# Patient Record
Sex: MALE | Race: WHITE | NOT HISPANIC OR LATINO | URBAN - NONMETROPOLITAN AREA
[De-identification: names, ages, dates, MRNs, and addresses within clinical notes are randomized per-mention and may not be internally consistent; named-entity substitution may affect disease eponyms.]

---

## 2020-06-19 ENCOUNTER — IMPORTED ENCOUNTER (OUTPATIENT)
Dept: URBAN - NONMETROPOLITAN AREA CLINIC 1 | Facility: CLINIC | Age: 70
End: 2020-06-19

## 2020-06-19 PROBLEM — H25.813: Noted: 2020-06-19

## 2020-06-19 PROBLEM — H02.002: Noted: 2020-06-19

## 2020-06-19 PROCEDURE — 99203 OFFICE O/P NEW LOW 30 MIN: CPT

## 2020-06-19 NOTE — PATIENT DISCUSSION
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 .-This can result in excessive tearing irritation infection scarring and loss of vision.-Start Tobdradex QID OD Sent to [facility] . May taper to BID once feeling better. -Start J and J shampoo on lids Cataract OU-Not yet surgical. -Reviewed symptoms of advancing cataract growth such as glare and halos and decreased vision.-Continue to monitor for now. Pt will notify us if any new symptoms develop.

## 2020-06-26 ENCOUNTER — IMPORTED ENCOUNTER (OUTPATIENT)
Dept: URBAN - NONMETROPOLITAN AREA CLINIC 1 | Facility: CLINIC | Age: 70
End: 2020-06-26

## 2020-06-26 PROBLEM — H25.813: Noted: 2020-06-19

## 2020-06-26 PROBLEM — H52.4: Noted: 2020-06-26

## 2020-06-26 PROBLEM — H02.002: Noted: 2020-06-19

## 2020-06-26 PROCEDURE — 92015 DETERMINE REFRACTIVE STATE: CPT

## 2020-06-26 PROCEDURE — 92012 INTRM OPH EXAM EST PATIENT: CPT

## 2020-06-26 NOTE — PATIENT DISCUSSION
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 Hernan Rangel - Continue to monitor

## 2020-07-09 ENCOUNTER — IMPORTED ENCOUNTER (OUTPATIENT)
Dept: URBAN - NONMETROPOLITAN AREA CLINIC 1 | Facility: CLINIC | Age: 70
End: 2020-07-09

## 2020-07-09 PROBLEM — H10.411: Noted: 2020-07-09

## 2020-07-09 PROBLEM — H25.813: Noted: 2020-07-09

## 2020-07-09 PROBLEM — H52.4: Noted: 2020-07-09

## 2020-07-09 PROBLEM — H01.021: Noted: 2020-07-09

## 2020-07-09 PROCEDURE — 99213 OFFICE O/P EST LOW 20 MIN: CPT

## 2020-07-09 NOTE — PATIENT DISCUSSION
Conjunctivitis OD / Blepharitis- Discussed diagnosis in detail with patient - Continue J & J baby shampoo to scrub lids- Start Tobradex QD or BID only as needed OD RX sent to pharmacy - Will consider consult if this continues - Continue to monitor ----------------------------------previous notes-------------------------------[place] OU- Discussed diagnosis in detail with patient- Discussed signs and symptoms of progression- Discussed UV protection- No treatment needed ra rhis time - Continue to monitorAstigmatism / Hyperopia / Presbyopia OU - Discussed diagnosis in detail with patient- New glasses RX given today- Continue to monitor- 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 Michael De La Cruz - Continue to monitor

## 2020-08-06 NOTE — PATIENT DISCUSSION
Do not recommend MF secondary to retinal pathology. Recommend patient maximize distance vision and use readers for near vision.

## 2020-08-19 NOTE — PATIENT DISCUSSION
Consider OD to follow. EYE OD, IOL TYPE Monofocal lens, POST OPERATIVE TARGET PL/-0.50, PACKAGE Custom.

## 2020-08-19 NOTE — PATIENT DISCUSSION
Proceed with OS first. EYE OS, IOL TYPE Monofocal lens, POST OPERATIVE TARGET PL/-0.50, PACKAGE Custom.

## 2020-08-19 NOTE — PATIENT DISCUSSION
Discussed increased risks of macular edema after cataract surgery secondary to ERM. Recommend extended drops schedule.

## 2020-08-19 NOTE — PATIENT DISCUSSION
Consider retinal evaluation after cataract surgery if ERM becomes bothersome. Patient has seen record [facility] in the 3-4 years.

## 2020-09-11 ENCOUNTER — IMPORTED ENCOUNTER (OUTPATIENT)
Dept: URBAN - NONMETROPOLITAN AREA CLINIC 1 | Facility: CLINIC | Age: 70
End: 2020-09-11

## 2020-09-11 PROBLEM — H25.813: Noted: 2020-09-11

## 2020-09-11 PROBLEM — H01.021: Noted: 2020-09-11

## 2020-09-11 PROBLEM — H02.022: Noted: 2020-09-11

## 2020-09-11 PROBLEM — H01.021: Noted: 2021-06-08

## 2020-09-11 PROBLEM — Z98.890: Noted: 2020-11-25

## 2020-09-11 PROBLEM — H52.4: Noted: 2020-09-11

## 2020-09-11 PROBLEM — H25.813: Noted: 2021-06-08

## 2020-09-11 PROBLEM — H01.024: Noted: 2021-06-08

## 2020-09-11 PROCEDURE — 99214 OFFICE O/P EST MOD 30 MIN: CPT

## 2020-09-11 NOTE — PATIENT DISCUSSION
Entropion RLL /Trichiasis RLL - Discussed diagnosis in detail with patient - Removed 4-5 lashes from RLL today at slitlamp with out complications and verbal consent from patient- Recommend patient seeing Dr. Cathi Del Toro for a lid eval. Patient agrees with plan  - Continue to montior - RTC A/S----------------------------------previous notes------------------------------- Blepharitis OU- Discussed diagnosis in detail with patient - Continue J & J baby shampoo to scrub lids- Start Tobradex QD or BID only as needed OD RX sent to pharmacy - Will consider consult if this continues - Continue to monitor [place] OU- Discussed diagnosis in detail with patient- Discussed signs and symptoms of progression- Discussed UV protection- No treatment needed at this time - Continue to monitorAstigmatism / Hyperopia / Presbyopia OU - Discussed diagnosis in detail with patient- New glasses RX given today- Continue to monitor- 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 Cathi Del Toro - Continue to monitor

## 2020-09-30 NOTE — PATIENT DISCUSSION
The patient feels that the cataract is significantly impacting daily activities and has elected cataract surgery. The risks, benefits, and alternatives to surgery were discussed. The patient elects to proceed with surgery. Split-Thickness Skin Graft Text: The defect edges were debeveled with a #15 scalpel blade.  Given the location of the defect, shape of the defect and the proximity to free margins a split thickness skin graft was deemed most appropriate.  Using a sterile surgical marker, the primary defect shape was transferred to the donor site. The split thickness graft was then harvested.  The skin graft was then placed in the primary defect and oriented appropriately.

## 2020-10-21 ENCOUNTER — IMPORTED ENCOUNTER (OUTPATIENT)
Dept: URBAN - NONMETROPOLITAN AREA CLINIC 1 | Facility: CLINIC | Age: 70
End: 2020-10-21

## 2020-10-21 PROCEDURE — 92012 INTRM OPH EXAM EST PATIENT: CPT

## 2020-10-21 NOTE — PATIENT DISCUSSION
Entropion RLL -Entropion (the lower eyelid rolling in causing lashes to rub against the eye) was explained to the patient. .-This can result in excessive tearing irritation infection scarring and loss of vision.-Treatment options include observation or surgical correction.-Risks and benefits of entropion repair were discussed and pt elects to proceed. Will schedule at patient's convenience.-Will have lyn call and schedule patient . Task Sent sent. -Discussed ASA and blood thinners and he denies being on in.

## 2020-10-26 NOTE — PATIENT DISCUSSION
CPM : Artificial Tears: One drop to both eyes 3-4 times daily. We recommend Systane or Refresh lubricating eye drops which can be found at any pharmacy.

## 2020-10-26 NOTE — PATIENT DISCUSSION
Discussed flashing lights around lens sound like ocular migraine. Pt denies HA to follow. Pt sts symptoms only last few minutes.

## 2020-10-26 NOTE — PATIENT DISCUSSION
Proceed with OS first. EYE OS, IOL TYPE Monofocal lens, POST OPERATIVE TARGET PL/-0.50, PACKAGE Custom. Urinary Device Placement

## 2020-11-11 ENCOUNTER — IMPORTED ENCOUNTER (OUTPATIENT)
Dept: URBAN - NONMETROPOLITAN AREA CLINIC 1 | Facility: CLINIC | Age: 70
End: 2020-11-11

## 2020-11-11 PROCEDURE — 67924 REPAIR EYELID DEFECT: CPT

## 2020-11-25 ENCOUNTER — IMPORTED ENCOUNTER (OUTPATIENT)
Dept: URBAN - NONMETROPOLITAN AREA CLINIC 1 | Facility: CLINIC | Age: 70
End: 2020-11-25

## 2020-11-25 PROCEDURE — 99024 POSTOP FOLLOW-UP VISIT: CPT

## 2020-11-25 NOTE — PATIENT DISCUSSION
Entropion RLL - Discussed diagnosis in detail with patient - No suture seen on todays exam in RLL - Lid looks good today - D/C E-Mycin ointment - Recommend J & J baby shampoo to scrub lids daily - Continue to monitor - RTC June for Complete

## 2021-06-08 ENCOUNTER — IMPORTED ENCOUNTER (OUTPATIENT)
Dept: URBAN - NONMETROPOLITAN AREA CLINIC 1 | Facility: CLINIC | Age: 71
End: 2021-06-08

## 2021-06-08 PROCEDURE — 99213 OFFICE O/P EST LOW 20 MIN: CPT

## 2021-06-08 PROCEDURE — 92015 DETERMINE REFRACTIVE STATE: CPT

## 2021-06-08 NOTE — PATIENT DISCUSSION
Cataracts OU- Discussed diagnosis in detail with patient- Discussed signs and symptoms of progression- Discussed UV protection- Recommend cataract eval with Dr. Noreen Bernabe patient states that he is moving to [facility] soon - Continue to monitorBlepharitis  OU - Discussed diagnosis in detail with patient- Recommend patient using J & J baby shampoo to scrub lid daily- Continue to monitorAstigmatism / Hyperopia / Presbyopia OU - Discussed diagnosis in detail with patient- Do not recommend updating due to cataracts progressing - Continue to monitor

## 2021-10-27 ENCOUNTER — IMPORTED ENCOUNTER (OUTPATIENT)
Dept: URBAN - NONMETROPOLITAN AREA CLINIC 1 | Facility: CLINIC | Age: 71
End: 2021-10-27

## 2021-10-27 PROBLEM — H01.021: Noted: 2021-10-27

## 2021-10-27 PROBLEM — H01.024: Noted: 2021-10-27

## 2021-10-27 PROBLEM — H52.4: Noted: 2021-10-27

## 2021-10-27 PROBLEM — H25.813: Noted: 2021-10-27

## 2021-10-27 PROBLEM — H02.052: Noted: 2021-10-27

## 2021-10-27 PROCEDURE — 67820 REVISE EYELASHES: CPT

## 2021-10-27 NOTE — PATIENT DISCUSSION
Trichiasis RLL - Discussed diagnosis in detail with patient - Removed lashes from RLL at slitlamp today without complications and verbal consent given by patient - Samples of Lotemax SM given today BID OD - Continue to monitor PREVIOUS NOTES Cataracts OU- Discussed diagnosis in detail with patient- Discussed signs and symptoms of progression- Discussed UV protection- Recommend cataract eval with Dr. My Palmer patient states that he is moving to [facility] soon - Continue to monitorBlepharitis  OU - Discussed diagnosis in detail with patient- Recommend patient using J & J baby shampoo to scrub lid daily- Continue to monitorAstigmatism / Hyperopia / Presbyopia OU - Discussed diagnosis in detail with patient- Do not recommend updating due to cataracts progressing - Continue to monitor

## 2021-11-29 ENCOUNTER — IMPORTED ENCOUNTER (OUTPATIENT)
Dept: URBAN - NONMETROPOLITAN AREA CLINIC 1 | Facility: CLINIC | Age: 71
End: 2021-11-29

## 2021-11-29 PROCEDURE — 67820 REVISE EYELASHES: CPT

## 2021-11-29 NOTE — PATIENT DISCUSSION
Trichiasis RLL - Discussed diagnosis in detail with patient - Removed lashes from RLL at slitlamp today without complications and verbal consent given by patient - Samples of Alrex given today BID OD - Continue to monitor PREVIOUS NOTES Cataracts OU- Discussed diagnosis in detail with patient- Discussed signs and symptoms of progression- Discussed UV protection- Recommend cataract eval with Dr. Giselle Carroll patient states that he is moving to [place] soon - Continue to monitorBlepharitis  OU - Discussed diagnosis in detail with patient- Recommend patient using J & J baby shampoo to scrub lid daily- Continue to monitorAstigmatism / Hyperopia / Presbyopia OU - Discussed diagnosis in detail with patient- Do not recommend updating due to cataracts progressing - Continue to monitor

## 2021-12-09 ENCOUNTER — IMPORTED ENCOUNTER (OUTPATIENT)
Dept: URBAN - NONMETROPOLITAN AREA CLINIC 1 | Facility: CLINIC | Age: 71
End: 2021-12-09

## 2021-12-09 PROBLEM — H52.4: Noted: 2021-10-27

## 2021-12-09 PROBLEM — H02.022: Noted: 2021-12-09

## 2021-12-09 PROBLEM — H01.021: Noted: 2021-10-27

## 2021-12-09 PROBLEM — H25.813: Noted: 2021-10-27

## 2021-12-09 PROBLEM — H01.024: Noted: 2021-10-27

## 2021-12-09 PROCEDURE — 99213 OFFICE O/P EST LOW 20 MIN: CPT

## 2021-12-09 PROCEDURE — 67820 REVISE EYELASHES: CPT

## 2021-12-09 NOTE — PATIENT DISCUSSION
Entropion w/ Trichiasis RLL - Discussed diagnosis in detail with patient - Hx of ectropion repair RLL in the past by Dr. Tiarra Knight- Slight edema/inflammation noted LL with eyelashes turning inward removed today at slit lamp after verbal informed consent without incident/problem. - Recommended Flarex OD TID sample given- Recommend evaluation by Dr. Tiarra Knight to see if revision is needed. - Continue to monitor per his recommendation--------------------- PREVIOUS NOTES -----------------------------------Cataracts OU- Discussed diagnosis in detail with patient- Discussed signs and symptoms of progression- Discussed UV protection- Recommend cataract eval with Dr. Tiarra Knight patient states that he is moving to [facility] soon - Continue to monitorBlepharitis  OU - Discussed diagnosis in detail with patient- Recommend patient using J & J baby shampoo to scrub lid daily- Continue to monitorAstigmatism / Hyperopia / Presbyopia OU - Discussed diagnosis in detail with patient- Do not recommend updating due to cataracts progressing - Continue to monitor

## 2022-01-13 ENCOUNTER — PREPPED CHART (OUTPATIENT)
Dept: URBAN - NONMETROPOLITAN AREA CLINIC 1 | Facility: CLINIC | Age: 72
End: 2022-01-13

## 2022-01-13 ENCOUNTER — IMPORTED ENCOUNTER (OUTPATIENT)
Dept: URBAN - NONMETROPOLITAN AREA CLINIC 1 | Facility: CLINIC | Age: 72
End: 2022-01-13

## 2022-01-13 PROCEDURE — 99213 OFFICE O/P EST LOW 20 MIN: CPT

## 2022-01-13 NOTE — PATIENT DISCUSSION
Discussed diagnosis in detail with patient - Hx of ectropion repair RLL in the past by Dr. Carmen Ny - Slight edema/inflammation noted LL with eyelashes turning inward, removed today at slit lamp after verbal informed consent without incident/problem. - Recommend lubricate with AT as much as possible throughout the day. Samples given today in office - Keep appointment scheduled with Dr. Zenaida Swartz for further evaluation - Continue to monitor per his recommendation.

## 2022-01-13 NOTE — PATIENT DISCUSSION
Entropion w/ recurrent  Trichiasis RLL - Discussed diagnosis in detail with patient - Hx of ectropion repair RLL in the past by Dr. Noreen Bernabe- Slight edema/inflammation noted LL with eyelashes turning inward removed    today at slit lamp after verbal informed consent without incident/problem. - Recommend lubricate with AT as much as possible throughout the day. Samples    given today in office- Keep appointment scheduled with Dr. Morris for further evaluation - Continue to monitor per his recommendation--------------------- PREVIOUS NOTES -----------------------------------Cataracts OU- Discussed diagnosis in detail with patient- Discussed signs and symptoms of progression- Discussed UV protection- Recommend cataract eval with Dr. Noreen Bernabe patient states that he is moving to [facility] soon - Continue to monitorBlepharitis  OU - Discussed diagnosis in detail with patient- Recommend patient using J & J baby shampoo to scrub lid daily- Continue to monitorAstigmatism / Hyperopia / Presbyopia OU - Discussed diagnosis in detail with patient- Do not recommend updating due to cataracts progressing - Continue to monitor

## 2022-02-28 ASSESSMENT — VISUAL ACUITY
OD_CC: 20/20-1
OS_CC: 20/25

## 2022-03-02 ENCOUNTER — CONSULTATION/EVALUATION (OUTPATIENT)
Dept: URBAN - NONMETROPOLITAN AREA CLINIC 1 | Facility: CLINIC | Age: 72
End: 2022-03-02

## 2022-03-02 DIAGNOSIS — H02.022: ICD-10-CM

## 2022-03-02 PROCEDURE — 99214 OFFICE O/P EST MOD 30 MIN: CPT

## 2022-03-16 ENCOUNTER — CLINIC PROCEDURE ONLY (OUTPATIENT)
Dept: URBAN - NONMETROPOLITAN AREA CLINIC 1 | Facility: CLINIC | Age: 72
End: 2022-03-16

## 2022-03-16 DIAGNOSIS — H02.012: ICD-10-CM

## 2022-03-16 PROCEDURE — 67924 REPAIR EYELID DEFECT: CPT

## 2022-03-25 ENCOUNTER — POST-OP (OUTPATIENT)
Dept: URBAN - NONMETROPOLITAN AREA CLINIC 1 | Facility: CLINIC | Age: 72
End: 2022-03-25

## 2022-03-25 DIAGNOSIS — Z98.890: ICD-10-CM

## 2022-03-25 PROCEDURE — 99024 POSTOP FOLLOW-UP VISIT: CPT

## 2022-03-25 ASSESSMENT — VISUAL ACUITY
OS_CC: 20/30-1
OD_CC: 20/25-2

## 2022-03-25 ASSESSMENT — TONOMETRY
OD_IOP_MMHG: 12
OS_IOP_MMHG: 14

## 2022-03-25 NOTE — PATIENT DISCUSSION
"Discussed findings w/ pt today. Healing well, continue ointment x4-5 more days. Sutures present, removed a few ""hanging"" today. Monitor PRN. "

## 2022-04-10 ASSESSMENT — TONOMETRY
OS_IOP_MMHG: 14
OS_IOP_MMHG: 15
OD_IOP_MMHG: 15
OS_IOP_MMHG: 12
OD_IOP_MMHG: 12
OD_IOP_MMHG: 17
OD_IOP_MMHG: 18
OD_IOP_MMHG: 16
OD_IOP_MMHG: 14
OD_IOP_MMHG: 15
OD_IOP_MMHG: 14
OD_IOP_MMHG: 14
OS_IOP_MMHG: 12
OS_IOP_MMHG: 17
OS_IOP_MMHG: 17
OS_IOP_MMHG: 14
OS_IOP_MMHG: 15
OS_IOP_MMHG: 16

## 2022-04-10 ASSESSMENT — VISUAL ACUITY
OS_SC: 20/25-2
OD_SC: 20/25
OD_SC: 20/25
OS_PH: 20/29+2
OS_PH: 20/25
OD_SC: 20/25-2
OD_SC: 20/25-
OS_SC: 20/20-2
OS_SC: 20/30-2
OD_SC: 20/25-2
OS_SC: 20/30
OD_SC: 20/25-2
OS_SC: 20/25
OS_SC: 20/30-2
OS_SC: 20/25-
OS_SC: 20/40
OS_SC: 20/30-2
OD_SC: 20/25-2
OS_SC: 20/25+2
OD_SC: 20/20
OD_SC: 20/20-1
OD_SC: 20/25-

## 2022-05-13 NOTE — PATIENT DISCUSSION
CPM : Artificial Tears: One drop to both eyes 3-4 times daily. We recommend Systane or Refresh lubricating eye drops which can be found at any pharmacy.
Consider further work up if symptoms persist.
DO NOT BILL MRX, CUSTOM PACKAGE.
Discussed doing YAG OD will strengthen her vision and help improve with reading difficulties.
Discussed flashing lights around lens sound like ocular migraine. Pt denies HA to follow. Pt sts symptoms only last few minutes.
Discussed increased risks of macular edema after cataract surgery secondary to ERM. Recommend extended drops schedule.
Do not recommend MF secondary to retinal pathology. Recommend patient maximize distance vision and use readers for near vision.
EYE OD, IOL TYPE Monofocal lens, POST OPERATIVE TARGET PL/-0.50, PACKAGE Custom.
Good postoperative appearance.
Indications, risks, benefits and alternatives to YAG capsulotomy discussed with patient. Questions answered. Educational handout given.
Mac OCT done today to evaluate. Small change Causing some shadow image.
Monitor.
Patient advised that retinal condition may limit visual recovery following cataract surgery.
Patient elects to proceed with YAG capsulotomy.
Patient states no change in vision.
Proceed with OS first. EYE OS, IOL TYPE Monofocal lens, POST OPERATIVE TARGET PL/-0.50, PACKAGE Custom.
Pt states seeing shadow. Trail framed and MR done in office today.
Recommend AREDS 2 multivitamin supplements. DRS advantage info given today.
Recommended observation.
Retinal tear and detachment warning symptoms reviewed and patient instructed to call immediately if increasing floaters, flashes, or decreasing peripheral vision.
Reviewed ocular, common and classic migraine. Auras discussed.
Stable.
The cataracts are responsible for the patient's decrease in vision.
The patient feels that the cataract is significantly impacting daily activities and has elected cataract surgery. The risks, benefits, and alternatives to surgery were discussed. The patient elects to proceed with surgery.
The risks, benefits and alternatives of cataract surgery were discussed with the patient. Risks including but not limited to: Infection, retinal detachment, lens dislocation, inflammation, loss of vision, increased pressure and need for further surgery. We discussed all the lens options including monofocal lens, toric lens, multifocal lens, astigmatism correction and other options. The patient understands that they may need glasses for optimal vision with any option.
minimum assist (75% patients effort)
